# Patient Record
(demographics unavailable — no encounter records)

---

## 2024-12-05 NOTE — FAMILY HISTORY
[TextEntry] : paternal grandmother possible ovarian or liver cancer age 47 or 48 Paternal grandfather colon cancer age 65  Denies any family history of breast, pancreatic, prostate cancer or melanoma.

## 2024-12-05 NOTE — DATA REVIEWED
[FreeTextEntry1] : 2/22/2024 ([facility]) bilateral screening mammogram/US: Scattered areas of fibroglandular density, bilateral scattered tiny cysts noted. Left 1:00 2 CMFN 0.7 x 0.5 x 1.3 cm new hypoechoic mass recommended for ultrasound-guided core biopsy. BI-RADS 4  3/4/2024 ([facility]) left breast US core BX 1:00 2 cm FN (S clip): Nonproliferative breast changes characterized by mild ductal hyperplasia, adenosis, fibroadenomatous change, columnar cell change and cystic and papillary apocrine metaplasia. Benign breast tissue showing dense stromal fibrosis with focal pseudo angiomatous stromal hyperplasia. Minute intraluminal proliferation is identified on the edge of the tissue, an E-cadherin stain was completed, confirming the diagnosis of atypical lobular hyperplasia, high risk concordant surgical consultation is recommended, additionally a 6-month follow-up left mammogram and ultrasound are recommended to assess for stability Consider high risk breast MRI in this patient if lifetime risk exceeds 20% *Sonographically visualized residual lesion was diminished in size post core biopsy  5/23/2024 ([facility]) bilateral breast MRI: Right breast 9 o'clock position area of non-mass enhancement for which MRI guided biopsy is recommended. Bilateral nonspecific tiny areas of enhancement are probably benign, a 6-month follow-up breast MRI is recommended. BI-RADS 4.  6/7/24 ([facility]) right MRI biopsy: benign breast tissue, benign and concordant, 6-month follow-up MRI is recommended.   7/17/24 ([facility]) surgical pathology: 1. Left breast, retroareolar, lumpectomy: - Lobular carcinoma in situ (LCIS), classical type - Fibrocystic changes, including usual ductal hyperplasia -Minute radial scar - Reactive changes consistent with prior biopsy site Note: An immunohistochemical stain for E-cadherin supports LCIS. Immunohistochemical stains for CK5/6 and ER support usual ductal hyperplasia.  11/27/24 ([facility]) MRI: No MR evidence of malignancy.  Recommend high risk screening MRI in one year. BI-RADS 2.

## 2024-12-05 NOTE — HISTORY OF PRESENT ILLNESS
[FreeTextEntry1] : 56 yo female presents for follow-up evaluation. She is s/p left lumpectomy on 7/17/24 for left ALH which was identified on screening ultrasound as a new 1.3 cm nodule in the left breast 1:00 2 cm FN. Final surgical pathology yielded LCIS, classic type, with a minute radial scar. Pt states that she does not have any palpable masses, skin lesions/changes, nipple discharge, or other breast complaints.  The patient underwent follow-up MRI today which returned as benign as seen below.  I recommended that we proceed with annual breast imaging due at the end of February of next year.  We discussed adding screening MRI to annual mammogram and sonogram.  The patient would like to add screening MRI to her annual breast imaging.  She met with medical oncology and decided not to proceed with risk reducing endocrine therapy as she has a significant family history of Alzheimer's disease and has concerns about the risk of hormone blockers with a risk of Alzheimer's disease.  ROSMERY lifetime risk is 52.7% she has no family history of breast or ovarian cancer

## 2024-12-05 NOTE — PHYSICAL EXAM
[Normocephalic] : normocephalic [EOMI] : extra ocular movement intact [Supple] : supple [No Supraclavicular Adenopathy] : no supraclavicular adenopathy [Examined in the supine and seated position] : examined in the supine and seated position [No dominant masses] : no dominant masses in right breast  [No dominant masses] : no dominant masses left breast [No Nipple Retraction] : no left nipple retraction [No Nipple Discharge] : no left nipple discharge [No Axillary Lymphadenopathy] : no left axillary lymphadenopathy [No Rashes] : no rashes [de-identified] : Well-healed periareolar lumpectomy incision

## 2024-12-05 NOTE — PLAN
[TextEntry] : Bilateral screening mammogram and sonogram due in February 2025 Patient to perform self-breast exams as instructed in the office. Patient to follow-up in February 2025 after imaging completed.

## 2024-12-05 NOTE — ASSESSMENT
[FreeTextEntry1] : 57-year-old female with a personal history of left breast lumpectomy on 7/17/2024 for left breast atypical lobular hyperplasia and LCIS